# Patient Record
Sex: FEMALE | Race: WHITE | URBAN - METROPOLITAN AREA
[De-identification: names, ages, dates, MRNs, and addresses within clinical notes are randomized per-mention and may not be internally consistent; named-entity substitution may affect disease eponyms.]

---

## 2017-09-08 ENCOUNTER — IMPORTED ENCOUNTER (OUTPATIENT)
Dept: URBAN - METROPOLITAN AREA CLINIC 50 | Facility: CLINIC | Age: 67
End: 2017-09-08

## 2017-09-20 ENCOUNTER — IMPORTED ENCOUNTER (OUTPATIENT)
Dept: URBAN - METROPOLITAN AREA CLINIC 50 | Facility: CLINIC | Age: 67
End: 2017-09-20

## 2017-09-21 ENCOUNTER — IMPORTED ENCOUNTER (OUTPATIENT)
Dept: URBAN - METROPOLITAN AREA CLINIC 50 | Facility: CLINIC | Age: 67
End: 2017-09-21

## 2018-01-02 ENCOUNTER — IMPORTED ENCOUNTER (OUTPATIENT)
Dept: URBAN - METROPOLITAN AREA CLINIC 50 | Facility: CLINIC | Age: 68
End: 2018-01-02

## 2018-01-23 ENCOUNTER — IMPORTED ENCOUNTER (OUTPATIENT)
Dept: URBAN - METROPOLITAN AREA CLINIC 50 | Facility: CLINIC | Age: 68
End: 2018-01-23

## 2018-02-06 ENCOUNTER — IMPORTED ENCOUNTER (OUTPATIENT)
Dept: URBAN - METROPOLITAN AREA CLINIC 50 | Facility: CLINIC | Age: 68
End: 2018-02-06

## 2018-02-20 ENCOUNTER — IMPORTED ENCOUNTER (OUTPATIENT)
Dept: URBAN - METROPOLITAN AREA CLINIC 50 | Facility: CLINIC | Age: 68
End: 2018-02-20

## 2018-03-08 ENCOUNTER — IMPORTED ENCOUNTER (OUTPATIENT)
Dept: URBAN - METROPOLITAN AREA CLINIC 50 | Facility: CLINIC | Age: 68
End: 2018-03-08

## 2018-03-13 ENCOUNTER — IMPORTED ENCOUNTER (OUTPATIENT)
Dept: URBAN - METROPOLITAN AREA CLINIC 50 | Facility: CLINIC | Age: 68
End: 2018-03-13

## 2018-03-23 ENCOUNTER — IMPORTED ENCOUNTER (OUTPATIENT)
Dept: URBAN - METROPOLITAN AREA CLINIC 50 | Facility: CLINIC | Age: 68
End: 2018-03-23

## 2018-03-28 ENCOUNTER — IMPORTED ENCOUNTER (OUTPATIENT)
Dept: URBAN - METROPOLITAN AREA CLINIC 50 | Facility: CLINIC | Age: 68
End: 2018-03-28

## 2018-04-23 ENCOUNTER — IMPORTED ENCOUNTER (OUTPATIENT)
Dept: URBAN - METROPOLITAN AREA CLINIC 50 | Facility: CLINIC | Age: 68
End: 2018-04-23

## 2018-09-13 ENCOUNTER — IMPORTED ENCOUNTER (OUTPATIENT)
Dept: URBAN - METROPOLITAN AREA CLINIC 50 | Facility: CLINIC | Age: 68
End: 2018-09-13

## 2018-09-17 ENCOUNTER — IMPORTED ENCOUNTER (OUTPATIENT)
Dept: URBAN - METROPOLITAN AREA CLINIC 50 | Facility: CLINIC | Age: 68
End: 2018-09-17

## 2018-10-02 ENCOUNTER — IMPORTED ENCOUNTER (OUTPATIENT)
Dept: URBAN - METROPOLITAN AREA CLINIC 50 | Facility: CLINIC | Age: 68
End: 2018-10-02

## 2018-10-02 NOTE — PATIENT DISCUSSION
"""Early. 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 MVI/AREDS/Amsler. Patient to call if vision changes or distortion increases. Good diet/do not smoke. """

## 2018-10-02 NOTE — PATIENT DISCUSSION
",""Informed patient that their cataract(s) are not visually significant or do not meet the criteria for ""

## 2019-04-09 ENCOUNTER — IMPORTED ENCOUNTER (OUTPATIENT)
Dept: URBAN - METROPOLITAN AREA CLINIC 50 | Facility: CLINIC | Age: 69
End: 2019-04-09

## 2019-04-09 NOTE — PATIENT DISCUSSION
"""IOP very stable on current therapy. Will monitor annually with testing."" ""Continue Shantell Ahumada left eye twice a day . """

## 2019-04-17 ENCOUNTER — IMPORTED ENCOUNTER (OUTPATIENT)
Dept: URBAN - METROPOLITAN AREA CLINIC 50 | Facility: CLINIC | Age: 69
End: 2019-04-17

## 2019-10-25 ENCOUNTER — IMPORTED ENCOUNTER (OUTPATIENT)
Dept: URBAN - METROPOLITAN AREA CLINIC 50 | Facility: CLINIC | Age: 69
End: 2019-10-25

## 2020-04-03 ENCOUNTER — IMPORTED ENCOUNTER (OUTPATIENT)
Dept: URBAN - METROPOLITAN AREA CLINIC 50 | Facility: CLINIC | Age: 70
End: 2020-04-03

## 2020-04-07 ENCOUNTER — IMPORTED ENCOUNTER (OUTPATIENT)
Dept: URBAN - METROPOLITAN AREA CLINIC 50 | Facility: CLINIC | Age: 70
End: 2020-04-07

## 2020-07-01 ENCOUNTER — IMPORTED ENCOUNTER (OUTPATIENT)
Dept: URBAN - METROPOLITAN AREA CLINIC 50 | Facility: CLINIC | Age: 70
End: 2020-07-01

## 2020-07-13 ENCOUNTER — IMPORTED ENCOUNTER (OUTPATIENT)
Dept: URBAN - METROPOLITAN AREA CLINIC 50 | Facility: CLINIC | Age: 70
End: 2020-07-13

## 2020-07-22 ENCOUNTER — IMPORTED ENCOUNTER (OUTPATIENT)
Dept: URBAN - METROPOLITAN AREA CLINIC 50 | Facility: CLINIC | Age: 70
End: 2020-07-22

## 2020-07-24 ENCOUNTER — IMPORTED ENCOUNTER (OUTPATIENT)
Dept: URBAN - METROPOLITAN AREA CLINIC 50 | Facility: CLINIC | Age: 70
End: 2020-07-24

## 2020-08-28 ENCOUNTER — IMPORTED ENCOUNTER (OUTPATIENT)
Dept: URBAN - METROPOLITAN AREA CLINIC 50 | Facility: CLINIC | Age: 70
End: 2020-08-28

## 2020-08-28 NOTE — PATIENT DISCUSSION
"""Patient given final contact lens prescription. 8/28/2020 "" ""Reviewed proper use and contact lens care. Discussed possible risks associated with contact lens wear.  Educated patient no sleeping

## 2020-10-07 ENCOUNTER — PREPPED CHART (OUTPATIENT)
Dept: URBAN - METROPOLITAN AREA CLINIC 53 | Facility: CLINIC | Age: 70
End: 2020-10-07

## 2020-10-07 ENCOUNTER — IMPORTED ENCOUNTER (OUTPATIENT)
Dept: URBAN - METROPOLITAN AREA CLINIC 50 | Facility: CLINIC | Age: 70
End: 2020-10-07

## 2020-10-07 NOTE — PATIENT DISCUSSION
"""Monitor ERM for changes. Informed patient of potential for worsening. Instructed patient to call with changes in vision. Recommend regular Amsler grid checks.  ""."

## 2020-10-07 NOTE — PATIENT DISCUSSION
"""Continue Artificial tears both eyes as needed . "" ""Continue HydroEye by mouth once a day . ""."

## 2021-04-16 ASSESSMENT — TONOMETRY
OD_IOP_MMHG: 15
OS_IOP_MMHG: 15
OD_IOP_MMHG: 16
OS_IOP_MMHG: 16

## 2021-04-16 ASSESSMENT — VISUAL ACUITY
OD_CC: 20/25
OS_CC: 20/25
OS_GLARE: 20/30
OD_GLARE: 20/30
OS_GLARE: 20/30
OD_GLARE: 20/30
OU_CC: J1+

## 2021-04-18 ASSESSMENT — VISUAL ACUITY
OD_BAT: 20/60
OD_CC: J1+
OD_CC: J1+(-2)@ 16 IN
OS_CC: J1+(-2)@ 16 IN
OD_PH: 20/25
OS_CC: 20/25
OS_OTHER: 20/30. 20/40.
OS_CC: 20/25
OD_CC: J1@ 14 IN
OS_CC: 20/25
OD_CC: J1@ 16 IN
OS_CC: J1@ 14 IN
OD_CC: 20/25-2
OD_CC: J1+@ 12 IN
OS_CC: 20/30
OS_BAT: 20/30
OS_CC: 20/50+2
OS_CC: J1+@ 12 IN
OD_CC: J1
OD_CC: 20/30
OD_OTHER: 20/30. 20/30.
OD_BAT: 20/60
OS_BAT: 20/60
OD_CC: 20/25
OD_CC: 20/50+2
OS_CC: 20/25
OS_CC: J1-
OS_CC: 20/25-
OD_BAT: 20/200
OS_PH: 20/30
OS_OTHER: 20/60. 20/70.
OS_BAT: 20/50
OS_BAT: 20/30
OS_OTHER: 20/100. >20/400.
OD_CC: J1+
OS_CC: J1+
OS_OTHER: 20/30. 20/30.
OD_OTHER: 20/60. 20/80.
OD_CC: 20/30
OD_OTHER: 20/25. 20/50.
OS_CC: J1+
OD_CC: 20/25-1
OS_CC: J1@ 16 IN
OD_CC: J1-
OD_BAT: 20/30
OS_CC: J1
OS_CC: 20/25-
OD_BAT: 20/25
OD_CC: 20/30
OD_CC: 20/30
OS_BAT: 20/100
OD_OTHER: 20/200. >20/400.
OD_CC: 20/30
OD_OTHER: 20/60. 20/80.
OD_CC: 20/40+-
OS_OTHER: 20/50. 20/60.
OS_CC: 20/20-1
OS_CC: 20/25-1
OS_CC: 20/30
OD_CC: 20/25-

## 2021-04-18 ASSESSMENT — PACHYMETRY
OD_CT_UM: 567
OD_CT_UM: 567
OS_CT_UM: 570
OS_CT_UM: 570
OD_CT_UM: 567
OD_CT_UM: 567
OS_CT_UM: 570
OS_CT_UM: 570
OD_CT_UM: 567
OS_CT_UM: 570
OD_CT_UM: 567
OD_CT_UM: 567
OS_CT_UM: 570
OS_CT_UM: 570
OD_CT_UM: 567
OD_CT_UM: 567
OS_CT_UM: 570
OD_CT_UM: 567
OD_CT_UM: 567
OS_CT_UM: 570
OD_CT_UM: 567
OS_CT_UM: 570
OD_CT_UM: 567
OS_CT_UM: 570
OS_CT_UM: 570
OD_CT_UM: 567
OS_CT_UM: 570
OD_CT_UM: 567
OD_CT_UM: 567
OS_CT_UM: 570
OD_CT_UM: 567
OS_CT_UM: 570
OD_CT_UM: 567
OS_CT_UM: 570

## 2021-04-18 ASSESSMENT — TONOMETRY
OD_IOP_MMHG: 17.5
OD_IOP_MMHG: 17
OS_IOP_MMHG: 15
OD_IOP_MMHG: 16
OD_IOP_MMHG: 16
OS_IOP_MMHG: 18
OS_IOP_MMHG: 16
OS_IOP_MMHG: 17
OD_IOP_MMHG: 18.5
OD_IOP_MMHG: 15
OS_IOP_MMHG: 18
OS_IOP_MMHG: 17
OD_IOP_MMHG: 17
OD_IOP_MMHG: 18
OD_IOP_MMHG: 17
OD_IOP_MMHG: 18
OS_IOP_MMHG: 17
OD_IOP_MMHG: 17
OS_IOP_MMHG: 19
OS_IOP_MMHG: 19
OD_IOP_MMHG: 18
OS_IOP_MMHG: 18

## 2021-04-21 ENCOUNTER — CATARACT EVALUATION (OUTPATIENT)
Dept: URBAN - METROPOLITAN AREA CLINIC 53 | Facility: CLINIC | Age: 71
End: 2021-04-21

## 2021-04-21 DIAGNOSIS — H25.813: ICD-10-CM

## 2021-04-21 DIAGNOSIS — H16.223: ICD-10-CM

## 2021-04-21 DIAGNOSIS — H43.813: ICD-10-CM

## 2021-04-21 DIAGNOSIS — H47.233: ICD-10-CM

## 2021-04-21 DIAGNOSIS — H35.373: ICD-10-CM

## 2021-04-21 DIAGNOSIS — H40.013: ICD-10-CM

## 2021-04-21 PROCEDURE — 92134 CPTRZ OPH DX IMG PST SGM RTA: CPT

## 2021-04-21 PROCEDURE — 92014 COMPRE OPH EXAM EST PT 1/>: CPT

## 2021-04-21 ASSESSMENT — VISUAL ACUITY
OS_CC: 20/25
OD_GLARE: 20/40
OD_GLARE: 20/50
OS_GLARE: 20/40
OD_CC: 20/30
OU_CC: J1
OS_GLARE: 20/30

## 2021-04-21 ASSESSMENT — TONOMETRY
OS_IOP_MMHG: 17
OS_IOP_MMHG: 16
OD_IOP_MMHG: 17
OD_IOP_MMHG: 16

## 2021-04-21 NOTE — PATIENT DISCUSSION
VISUALLY SIGNIFICANT: Informed patient that their cataract is visually significant and that surgery is recommended to improve patient's visual acuity and/or glare symptoms. RBAs of surgery discussed and questions answered. Patient to schedule biometry measurements and surgery.

## 2021-06-22 ENCOUNTER — BIOMETRY (OUTPATIENT)
Dept: URBAN - METROPOLITAN AREA CLINIC 53 | Facility: CLINIC | Age: 71
End: 2021-06-22

## 2021-06-22 DIAGNOSIS — H25.813: ICD-10-CM

## 2021-06-22 PROCEDURE — TOPOIOL CORNEAL TOPOGRAPHY-PREMIUM IOL

## 2021-06-22 PROCEDURE — 92136 OPHTHALMIC BIOMETRY: CPT

## 2021-06-22 ASSESSMENT — KERATOMETRY
OD_AXISANGLE_DEGREES: 095
OD_K2POWER_DIOPTERS: 43.50
OD_AXISANGLE2_DEGREES: 5
OS_K1POWER_DIOPTERS: 44.37
OS_AXISANGLE2_DEGREES: 175
OD_K1POWER_DIOPTERS: 43.87
OS_K2POWER_DIOPTERS: 43.12
OS_AXISANGLE_DEGREES: 085

## 2021-06-22 NOTE — PATIENT DISCUSSION
WILL DO IMMERSION WITH FRENCH PRIOR TO PRE OP DUE TO MACHINE AT [facility] GIVING INCONSISTENT MEASUREMENTS.

## 2021-06-25 ENCOUNTER — BIOMETRY (OUTPATIENT)
Dept: URBAN - METROPOLITAN AREA CLINIC 49 | Facility: CLINIC | Age: 71
End: 2021-06-25

## 2021-06-25 DIAGNOSIS — H25.813: ICD-10-CM

## 2021-06-25 PROCEDURE — 92136 OPHTHALMIC BIOMETRY: CPT

## 2021-06-25 ASSESSMENT — KERATOMETRY
OS_AXISANGLE_DEGREES: 085
OS_AXISANGLE2_DEGREES: 175
OD_K2POWER_DIOPTERS: 43.50
OD_K1POWER_DIOPTERS: 43.87
OS_K2POWER_DIOPTERS: 43.12
OD_AXISANGLE2_DEGREES: 5
OD_AXISANGLE_DEGREES: 095
OS_K1POWER_DIOPTERS: 44.37

## 2021-06-25 NOTE — PATIENT DISCUSSION
WILL DO IMMERSION WITH FRENCH PRIOR TO PRE OP DUE TO MACHINE AT [facility] GIVING INCONSISTENT MEASUREMENTS.

## 2021-06-30 ENCOUNTER — CATARACT PRE-OP (OUTPATIENT)
Dept: URBAN - METROPOLITAN AREA CLINIC 53 | Facility: CLINIC | Age: 71
End: 2021-06-30

## 2021-06-30 DIAGNOSIS — H35.373: ICD-10-CM

## 2021-06-30 DIAGNOSIS — H25.811: ICD-10-CM

## 2021-06-30 PROCEDURE — 92134 CPTRZ OPH DX IMG PST SGM RTA: CPT

## 2021-06-30 PROCEDURE — PREOP PRE OP VISIT

## 2021-06-30 ASSESSMENT — TONOMETRY
OD_IOP_MMHG: 18
OS_IOP_MMHG: 18

## 2021-06-30 ASSESSMENT — KERATOMETRY
OS_AXISANGLE_DEGREES: 085
OD_K2POWER_DIOPTERS: 43.50
OS_K1POWER_DIOPTERS: 44.37
OD_K1POWER_DIOPTERS: 43.87
OS_AXISANGLE2_DEGREES: 175
OD_AXISANGLE2_DEGREES: 5
OD_AXISANGLE_DEGREES: 095
OS_K2POWER_DIOPTERS: 43.12

## 2021-06-30 ASSESSMENT — VISUAL ACUITY
OD_CC: 20/40
OS_CC: 20/40

## 2021-06-30 NOTE — PATIENT DISCUSSION
Long discussion on how MF lens would not be a great fit. High probability of glare and halos with MF lens.

## 2021-06-30 NOTE — PATIENT DISCUSSION
Patient aware of the part time/full time use of glasses or CL after surgery secondary to high myopia.

## 2021-06-30 NOTE — PATIENT DISCUSSION
CATARACT SURGERY PLANNER - STANDARD IOL/+FEMTO: Phacoemulsification with IOL: Eye: OD|DOS: 7/15/21|Model: DIBOO|Power: 10. 5|Target: PLANO|Femto: YES|Arcs: 25 @ 95 ; 25 @ 275|Visc: DUET|Omidria: YES|10% Phenylephrine: YES|Epi-shugarcaine: YES|Phaco Setting: STD|BSS+: NO|Trypan Blue: NO|CTR: NO|Olive Tip: NO|Atropine: NO|Pupilloplasty: NO| +TM|Notes: PLAN: DIBOO @ PLANO OU; HX ERM OD>OS +MILD TRACTION OD, MICRODRUSEN OS, LR GLAUC SUSP OU, H/O RPG'S. DILATED PUPIL: UNKNOWN.

## 2021-07-15 ENCOUNTER — SURGERY/PROCEDURE (OUTPATIENT)
Dept: URBAN - METROPOLITAN AREA SURGERY 16 | Facility: SURGERY | Age: 71
End: 2021-07-15

## 2021-07-15 ENCOUNTER — SAME DAY PO (OUTPATIENT)
Dept: URBAN - METROPOLITAN AREA CLINIC 48 | Facility: CLINIC | Age: 71
End: 2021-07-15

## 2021-07-15 DIAGNOSIS — Z98.41: ICD-10-CM

## 2021-07-15 DIAGNOSIS — H25.811: ICD-10-CM

## 2021-07-15 PROCEDURE — 66984 XCAPSL CTRC RMVL W/O ECP: CPT

## 2021-07-15 PROCEDURE — DIB00FEMTO EYHANCE MONOFOCAL IOL WITH FEMTO

## 2021-07-15 PROCEDURE — 99024 POSTOP FOLLOW-UP VISIT: CPT

## 2021-07-15 ASSESSMENT — KERATOMETRY
OD_AXISANGLE2_DEGREES: 5
OS_K1POWER_DIOPTERS: 44.37
OD_K1POWER_DIOPTERS: 43.87
OD_AXISANGLE_DEGREES: 095
OD_K2POWER_DIOPTERS: 43.50
OD_K2POWER_DIOPTERS: 43.50
OD_K1POWER_DIOPTERS: 43.87
OS_AXISANGLE_DEGREES: 085
OS_K2POWER_DIOPTERS: 43.12
OS_AXISANGLE2_DEGREES: 175
OS_AXISANGLE2_DEGREES: 175
OD_AXISANGLE2_DEGREES: 5
OD_AXISANGLE_DEGREES: 095
OS_K1POWER_DIOPTERS: 44.37
OS_AXISANGLE_DEGREES: 085
OS_K2POWER_DIOPTERS: 43.12

## 2021-07-15 ASSESSMENT — TONOMETRY: OD_IOP_MMHG: 22

## 2021-07-21 ENCOUNTER — PRE OP - CE/IOL OS / 1 WEEK PO OD (OUTPATIENT)
Dept: URBAN - METROPOLITAN AREA CLINIC 53 | Facility: CLINIC | Age: 71
End: 2021-07-21

## 2021-07-21 DIAGNOSIS — Z96.1: ICD-10-CM

## 2021-07-21 DIAGNOSIS — H25.812: ICD-10-CM

## 2021-07-21 PROCEDURE — 92136 - 2N OPHTHALMIC BIOMETRY BY PARTIAL COHERENCE INTERFEROMETRY WITH INTRAOCULAR LENS POWER CALCULATION

## 2021-07-21 PROCEDURE — PREOP PRE OP VISIT

## 2021-07-21 ASSESSMENT — KERATOMETRY
OS_AXISANGLE2_DEGREES: 175
OS_AXISANGLE_DEGREES: 085
OS_K1POWER_DIOPTERS: 44.37
OS_K2POWER_DIOPTERS: 43.12
OD_AXISANGLE2_DEGREES: 5
OD_K2POWER_DIOPTERS: 43.50
OD_AXISANGLE_DEGREES: 095
OD_K1POWER_DIOPTERS: 43.87

## 2021-07-21 ASSESSMENT — VISUAL ACUITY
OD_PH: 20/25-2
OS_SC: CF 5FT
OD_SC: J3-
OD_SC: J3-
OD_SC: 20/40

## 2021-07-21 ASSESSMENT — TONOMETRY
OD_IOP_MMHG: 18
OS_IOP_MMHG: 18

## 2021-07-21 NOTE — PATIENT DISCUSSION
CATARACT SURGERY PLANNER - STANDARD IOL/+FEMTO/ORA: Phacoemulsification with IOL: Eye: OS|DOS: 7/22/21|Model: DIBOO|Power: 8.0 ORA|Target: PLANO|Femto: YES|Arcs: 50 @ 80 ; 50 @ 260|Visc: DUET|Omidria: YES|10% Phenylephrine: YES|Epi-shugarcaine: YES|Phaco Setting: STD|BSS+: NO|Trypan Blue: NO|CTR: NO|Olive Tip: NO|Atropine: NO|Pupilloplasty: NO|Notes: PLAN: DIBOO @ PLANO OU; HX ERM OD>OS +MILD TRX OD, MICRODRUSEN OD, LR GLAUC SUSP OU. DILATED PUPIL: UNKNOWN.

## 2021-07-22 ENCOUNTER — SURGERY/PROCEDURE (OUTPATIENT)
Dept: URBAN - METROPOLITAN AREA SURGERY 16 | Facility: SURGERY | Age: 71
End: 2021-07-22

## 2021-07-22 ENCOUNTER — SAME DAY PO (OUTPATIENT)
Dept: URBAN - METROPOLITAN AREA CLINIC 48 | Facility: CLINIC | Age: 71
End: 2021-07-22

## 2021-07-22 DIAGNOSIS — H25.812: ICD-10-CM

## 2021-07-22 DIAGNOSIS — Z98.42: ICD-10-CM

## 2021-07-22 DIAGNOSIS — Z96.1: ICD-10-CM

## 2021-07-22 PROCEDURE — 66984 XCAPSL CTRC RMVL W/O ECP: CPT

## 2021-07-22 PROCEDURE — 99024 POSTOP FOLLOW-UP VISIT: CPT

## 2021-07-22 PROCEDURE — DIB00FEMTO EYHANCE MONOFOCAL IOL WITH FEMTO

## 2021-07-22 ASSESSMENT — KERATOMETRY
OD_AXISANGLE2_DEGREES: 5
OD_AXISANGLE2_DEGREES: 5
OS_K2POWER_DIOPTERS: 43.12
OS_AXISANGLE2_DEGREES: 175
OS_K1POWER_DIOPTERS: 44.37
OS_AXISANGLE_DEGREES: 085
OS_AXISANGLE_DEGREES: 085
OD_AXISANGLE_DEGREES: 095
OS_K2POWER_DIOPTERS: 43.12
OD_K1POWER_DIOPTERS: 43.87
OS_AXISANGLE2_DEGREES: 175
OS_K1POWER_DIOPTERS: 44.37
OD_K1POWER_DIOPTERS: 43.87
OD_K2POWER_DIOPTERS: 43.50
OD_K2POWER_DIOPTERS: 43.50
OD_AXISANGLE_DEGREES: 095

## 2021-07-22 ASSESSMENT — VISUAL ACUITY: OS_SC: 20/60-2

## 2021-07-22 ASSESSMENT — TONOMETRY: OS_IOP_MMHG: 28

## 2021-07-28 ENCOUNTER — 1 WEEK POST-OP (OUTPATIENT)
Dept: URBAN - METROPOLITAN AREA CLINIC 53 | Facility: CLINIC | Age: 71
End: 2021-07-28

## 2021-07-28 DIAGNOSIS — Z98.42: ICD-10-CM

## 2021-07-28 DIAGNOSIS — Z96.1: ICD-10-CM

## 2021-07-28 DIAGNOSIS — H35.373: ICD-10-CM

## 2021-07-28 PROCEDURE — 92134 CPTRZ OPH DX IMG PST SGM RTA: CPT

## 2021-07-28 PROCEDURE — 99024 POSTOP FOLLOW-UP VISIT: CPT

## 2021-07-28 ASSESSMENT — KERATOMETRY
OD_K2POWER_DIOPTERS: 43.50
OD_AXISANGLE_DEGREES: 095
OD_K1POWER_DIOPTERS: 43.87
OS_AXISANGLE2_DEGREES: 175
OS_K2POWER_DIOPTERS: 43.12
OD_AXISANGLE2_DEGREES: 5
OS_K1POWER_DIOPTERS: 44.37
OS_AXISANGLE_DEGREES: 085

## 2021-07-28 ASSESSMENT — VISUAL ACUITY
OS_SC: 20/30
OD_SC: 20/40
OS_PH: 20/20
OD_PH: 20/30

## 2021-07-28 ASSESSMENT — TONOMETRY
OD_IOP_MMHG: 21
OS_IOP_MMHG: 21

## 2021-07-28 NOTE — PATIENT DISCUSSION
Patient doing well post-operatively. Advised patient to continue post-operative drops and instructions as directed.

## 2021-07-28 NOTE — PATIENT DISCUSSION
Discussed with patient that her distortion of vision OD>OS that she is experiencing is secondary to macular pathology and the only way to correct is consult with retinal surgeon for a possible Membrane Peel.

## 2021-08-16 ENCOUNTER — 4 WEEK PO - CE/IOL (OUTPATIENT)
Dept: URBAN - METROPOLITAN AREA CLINIC 53 | Facility: CLINIC | Age: 71
End: 2021-08-16

## 2021-08-16 DIAGNOSIS — Z96.1: ICD-10-CM

## 2021-08-16 PROCEDURE — 92015 DETERMINE REFRACTIVE STATE: CPT

## 2021-08-16 PROCEDURE — 99024 POSTOP FOLLOW-UP VISIT: CPT

## 2021-08-16 ASSESSMENT — VISUAL ACUITY
OD_SC: 20/60-
OS_SC: J3
OS_SC: 20/40-
OD_SC: J3-

## 2021-08-16 ASSESSMENT — TONOMETRY
OD_IOP_MMHG: 17
OS_IOP_MMHG: 18

## 2021-09-10 ENCOUNTER — CONTACT LENS FITTING NEW (OUTPATIENT)
Dept: URBAN - METROPOLITAN AREA CLINIC 53 | Facility: CLINIC | Age: 71
End: 2021-09-10

## 2021-09-10 DIAGNOSIS — Z96.1: ICD-10-CM

## 2021-09-10 DIAGNOSIS — H52.4: ICD-10-CM

## 2021-09-10 PROCEDURE — NCCON CL FIT/CHECK, NO CHARGE

## 2021-09-10 ASSESSMENT — VISUAL ACUITY
OD_SC: 20/60
OU_SC: 20/25-1
OS_SC: 20/25-1
OU_CC: J1

## 2021-09-10 NOTE — PATIENT DISCUSSION
KEYUR obtained today. Will order RGP MF lens to [facility] locations. Dispense at CL Check 2-3 weeks.

## 2021-10-01 ENCOUNTER — CONTACT LENS CHECK (OUTPATIENT)
Dept: URBAN - METROPOLITAN AREA CLINIC 53 | Facility: CLINIC | Age: 71
End: 2021-10-01

## 2021-10-01 DIAGNOSIS — Z96.1: ICD-10-CM

## 2021-10-01 DIAGNOSIS — H52.4: ICD-10-CM

## 2021-10-01 PROCEDURE — CLF EW RGP

## 2021-10-01 ASSESSMENT — VISUAL ACUITY
OS_SC: 20/30+2
OD_SC: 20/60-2
OU_SC: 20/30

## 2021-10-29 ENCOUNTER — CONTACT LENS CHECK (OUTPATIENT)
Dept: URBAN - METROPOLITAN AREA CLINIC 53 | Facility: CLINIC | Age: 71
End: 2021-10-29

## 2021-10-29 DIAGNOSIS — Z96.1: ICD-10-CM

## 2021-10-29 DIAGNOSIS — H52.4: ICD-10-CM

## 2021-10-29 PROCEDURE — NCCON CL FIT/CHECK, NO CHARGE

## 2021-11-10 ENCOUNTER — 4 MONTH FOLLOW-UP (OUTPATIENT)
Dept: URBAN - METROPOLITAN AREA CLINIC 48 | Facility: CLINIC | Age: 71
End: 2021-11-10

## 2021-11-10 DIAGNOSIS — Z96.1: ICD-10-CM

## 2021-11-10 DIAGNOSIS — H43.813: ICD-10-CM

## 2021-11-10 DIAGNOSIS — H26.493: ICD-10-CM

## 2021-11-10 DIAGNOSIS — H16.223: ICD-10-CM

## 2021-11-10 DIAGNOSIS — H35.373: ICD-10-CM

## 2021-11-10 DIAGNOSIS — H40.013: ICD-10-CM

## 2021-11-10 PROCEDURE — 92014 COMPRE OPH EXAM EST PT 1/>: CPT

## 2021-11-10 ASSESSMENT — VISUAL ACUITY
OD_GLARE: 20/50
OS_CC: 20/25
OS_GLARE: 20/60
OU_CC: 20/25
OD_CC: 20/25-2
OS_GLARE: 20/50
OU_CC: J1
OD_GLARE: 20/60

## 2021-11-10 ASSESSMENT — TONOMETRY
OS_IOP_MMHG: 20
OD_IOP_MMHG: 20

## 2021-12-01 ENCOUNTER — YAG CAPSULOTOMY OD (OUTPATIENT)
Dept: URBAN - METROPOLITAN AREA CLINIC 53 | Facility: CLINIC | Age: 71
End: 2021-12-01

## 2021-12-01 DIAGNOSIS — H26.491: ICD-10-CM

## 2021-12-01 PROCEDURE — 66821 AFTER CATARACT LASER SURGERY: CPT

## 2021-12-01 RX ORDER — PREDNISOLONE ACETATE 10 MG/ML
1 SUSPENSION/ DROPS OPHTHALMIC TWICE A DAY
Start: 2021-12-01

## 2021-12-01 ASSESSMENT — VISUAL ACUITY
OD_PH: 20/40+2
OD_SC: 20/60
OS_PH: 20/20
OS_SC: 20/30

## 2021-12-01 ASSESSMENT — TONOMETRY
OS_IOP_MMHG: 18
OD_IOP_MMHG: 17

## 2021-12-01 NOTE — PROCEDURE NOTE: CLINICAL
PROCEDURE NOTE: YAG Capsulotomy OD. Diagnosis: Posterior Capsular Opacification (PCO). Prior to treatment, the risks/benefits/alternatives were discussed. The patient wished to proceed with procedure. Consent was signed. Proparacaine and brominidine were placed into the operative eye after the eye was dilated. Power = 1.6mJ. Number of pulses = 22. Patient tolerated procedure well and there were no complications. Post Laser instructions given. Dianna Silver

## 2021-12-08 ENCOUNTER — CLINIC PROCEDURE ONLY (OUTPATIENT)
Dept: URBAN - METROPOLITAN AREA CLINIC 53 | Facility: CLINIC | Age: 71
End: 2021-12-08

## 2021-12-08 DIAGNOSIS — H26.492: ICD-10-CM

## 2021-12-08 PROCEDURE — 66821 AFTER CATARACT LASER SURGERY: CPT

## 2021-12-08 ASSESSMENT — VISUAL ACUITY
OS_SC: 20/50+2
OD_SC: 20/50-2

## 2021-12-08 ASSESSMENT — TONOMETRY
OD_IOP_MMHG: 15
OS_IOP_MMHG: 16

## 2021-12-08 NOTE — PROCEDURE NOTE: CLINICAL
PROCEDURE NOTE: YAG Capsulotomy OS. Diagnosis: Posterior Capsular Opacification (PCO). Prior to treatment, the risks/benefits/alternatives were discussed. The patient wished to proceed with procedure. Consent was signed. Proparacaine and brominidine were placed into the operative eye after the eye was dilated. Power = 1.7mJ. Number of pulses = 20. Patient tolerated procedure well and there were no complications. Post Laser instructions given. Virgilio Loomis

## 2021-12-08 NOTE — PATIENT DISCUSSION
Yag Cap performed today with patient's signed consent. Patient to start Prednisolone acetate BID x7 days, then decrease to QD x7 days, then stop.

## 2022-01-05 ENCOUNTER — POST-OP (OUTPATIENT)
Dept: URBAN - METROPOLITAN AREA CLINIC 48 | Facility: CLINIC | Age: 72
End: 2022-01-05

## 2022-01-05 DIAGNOSIS — Z98.890: ICD-10-CM

## 2022-01-05 PROCEDURE — 99024 POSTOP FOLLOW-UP VISIT: CPT

## 2022-01-05 ASSESSMENT — VISUAL ACUITY
OD_CC: 20/30+1
OD_SC: 20/70+1
OS_SC: 20/30+2
OU_CC: J1+
OD_CC: J1+
OS_CC: J1+
OU_CC: 20/25
OS_CC: 20/25-1

## 2022-01-05 ASSESSMENT — TONOMETRY
OS_IOP_MMHG: 16
OD_IOP_MMHG: 17

## 2022-02-03 ENCOUNTER — DIAGNOSTICS ONLY (OUTPATIENT)
Dept: URBAN - METROPOLITAN AREA CLINIC 48 | Facility: CLINIC | Age: 72
End: 2022-02-03

## 2022-02-03 DIAGNOSIS — H40.013: ICD-10-CM

## 2022-02-03 PROCEDURE — 92133 CPTRZD OPH DX IMG PST SGM ON: CPT

## 2022-02-03 PROCEDURE — 92083 EXTENDED VISUAL FIELD XM: CPT

## 2022-02-03 NOTE — PATIENT DISCUSSION
OCT RNFL (02/03/2022) wnl and stable OD/OS. HVF (02/03/2022) ess full OD and sup defect OS which does not correlate with RNFL. Continue Simbrinza bid OS.

## 2022-05-16 NOTE — PATIENT DISCUSSION
Patient doing well post-operatively. Advised patient to continue post-operative drops and instructions as directed. (0) age 40 years or less

## 2022-06-09 ENCOUNTER — COMPREHENSIVE EXAM (OUTPATIENT)
Dept: URBAN - METROPOLITAN AREA CLINIC 48 | Facility: CLINIC | Age: 72
End: 2022-06-09

## 2022-06-09 DIAGNOSIS — H16.223: ICD-10-CM

## 2022-06-09 DIAGNOSIS — H43.813: ICD-10-CM

## 2022-06-09 DIAGNOSIS — H35.373: ICD-10-CM

## 2022-06-09 DIAGNOSIS — H40.013: ICD-10-CM

## 2022-06-09 PROCEDURE — 92014 COMPRE OPH EXAM EST PT 1/>: CPT

## 2022-06-09 PROCEDURE — 92134 CPTRZ OPH DX IMG PST SGM RTA: CPT

## 2022-06-09 ASSESSMENT — VISUAL ACUITY
OS_CC: 20/20-1
OU_CC: J1+
OU_CC: 20/20
OD_CC: 20/25

## 2022-06-09 ASSESSMENT — TONOMETRY
OD_IOP_MMHG: 18
OS_IOP_MMHG: 16

## 2022-06-28 ENCOUNTER — DIAGNOSTICS ONLY (OUTPATIENT)
Dept: URBAN - METROPOLITAN AREA CLINIC 48 | Facility: CLINIC | Age: 72
End: 2022-06-28

## 2022-06-28 DIAGNOSIS — H40.013: ICD-10-CM

## 2022-06-28 PROCEDURE — 92083 EXTENDED VISUAL FIELD XM: CPT

## 2022-06-28 NOTE — PATIENT DISCUSSION
OCT RNFL (02/03/2022) wnl and stable OD/OS. HVF (02/03/2022) ess full OD and sup defect OS which does not correlate with RNFL. Repeat HVF OS (06/28/2022) wnl and improved.  Continue Simbrinza bid OS.

## 2022-08-22 NOTE — PATIENT DISCUSSION
Retinal tear and detachment warning symptoms reviewed and patient instructed to call immediately if increasing floaters, flashes, or decreasing peripheral vision. No

## 2023-01-26 ENCOUNTER — FOLLOW UP (OUTPATIENT)
Dept: URBAN - METROPOLITAN AREA CLINIC 48 | Facility: LOCATION | Age: 73
End: 2023-01-26

## 2023-01-26 DIAGNOSIS — H40.013: ICD-10-CM

## 2023-01-26 DIAGNOSIS — H35.3131: ICD-10-CM

## 2023-01-26 PROCEDURE — 92012 INTRM OPH EXAM EST PATIENT: CPT

## 2023-01-26 PROCEDURE — 92134 CPTRZ OPH DX IMG PST SGM RTA: CPT

## 2023-01-26 ASSESSMENT — VISUAL ACUITY
OD_CC: 20/25-2
OS_CC: 20/25

## 2023-01-26 ASSESSMENT — TONOMETRY
OS_IOP_MMHG: 16
OD_IOP_MMHG: 18

## 2023-01-26 NOTE — PATIENT DISCUSSION
OCT RNFL (02/03/2022) wnl and stable OD/OS. HVF (02/03/2022) ess full OD and sup defect OS which does not correlate with RNFL. Repeat HVF OS (06/28/2022) wnl and improved.  Continue Simbrinza bid OS at this time. Will do a 2 week trial off gtt therapy.  Advised patient to stop Simbrinza 2 weeks prior to 6 month Comp Exam.

## 2023-07-27 ENCOUNTER — COMPREHENSIVE EXAM (OUTPATIENT)
Dept: URBAN - METROPOLITAN AREA CLINIC 48 | Facility: LOCATION | Age: 73
End: 2023-07-27

## 2023-07-27 DIAGNOSIS — H35.3131: ICD-10-CM

## 2023-07-27 DIAGNOSIS — H43.813: ICD-10-CM

## 2023-07-27 DIAGNOSIS — H52.4: ICD-10-CM

## 2023-07-27 DIAGNOSIS — H35.373: ICD-10-CM

## 2023-07-27 DIAGNOSIS — H40.053: ICD-10-CM

## 2023-07-27 PROCEDURE — 92083 EXTENDED VISUAL FIELD XM: CPT

## 2023-07-27 PROCEDURE — 92015 DETERMINE REFRACTIVE STATE: CPT

## 2023-07-27 PROCEDURE — 92133 CPTRZD OPH DX IMG PST SGM ON: CPT

## 2023-07-27 PROCEDURE — 92014 COMPRE OPH EXAM EST PT 1/>: CPT

## 2023-07-27 RX ORDER — BRINZOLAMIDE/BRIMONIDINE TARTRATE 10; 2 MG/ML; MG/ML: 1 SUSPENSION/ DROPS OPHTHALMIC TWICE A DAY

## 2023-07-27 ASSESSMENT — KERATOMETRY
OD_AXISANGLE_DEGREES: 180
OS_K2POWER_DIOPTERS: 44.00
OS_AXISANGLE2_DEGREES: 75
OD_AXISANGLE2_DEGREES: 90
OS_K1POWER_DIOPTERS: 43.50
OS_AXISANGLE_DEGREES: 165
OD_K2POWER_DIOPTERS: 43.75
OD_K1POWER_DIOPTERS: 43.75

## 2023-07-27 ASSESSMENT — VISUAL ACUITY
OD_CC: 20/25-2
OU_CC: J1
OS_CC: 20/20-2

## 2023-07-27 ASSESSMENT — TONOMETRY
OS_IOP_MMHG: 21
OD_IOP_MMHG: 21
OS_IOP_MMHG: 20
OD_IOP_MMHG: 22

## 2024-01-31 ENCOUNTER — FOLLOW UP (OUTPATIENT)
Dept: URBAN - METROPOLITAN AREA CLINIC 48 | Facility: LOCATION | Age: 74
End: 2024-01-31

## 2024-01-31 DIAGNOSIS — H35.3131: ICD-10-CM

## 2024-01-31 DIAGNOSIS — H40.053: ICD-10-CM

## 2024-01-31 PROCEDURE — 99214 OFFICE O/P EST MOD 30 MIN: CPT

## 2024-01-31 PROCEDURE — 92134 CPTRZ OPH DX IMG PST SGM RTA: CPT

## 2024-01-31 ASSESSMENT — KERATOMETRY
OD_AXISANGLE2_DEGREES: 90
OS_AXISANGLE2_DEGREES: 75
OS_K2POWER_DIOPTERS: 44.00
OD_AXISANGLE_DEGREES: 180
OS_AXISANGLE_DEGREES: 165
OD_K2POWER_DIOPTERS: 43.75
OS_K1POWER_DIOPTERS: 43.50
OD_K1POWER_DIOPTERS: 43.75

## 2024-01-31 ASSESSMENT — TONOMETRY
OS_IOP_MMHG: 19
OD_IOP_MMHG: 20
OD_IOP_MMHG: 20
OS_IOP_MMHG: 19

## 2024-01-31 ASSESSMENT — VISUAL ACUITY
OD_CC: 20/25-2
OS_CC: 20/20-1

## 2024-04-30 NOTE — PATIENT DISCUSSION
"""IOP stable both eyes. Reviewed ERG with Patient. Test was unreliable.  Patient to repeat "" Pt stated she's not sure if she takes the medication, rx sent 4/16/24 but also states not taking, will have her caregiver call with information - was it p/u or not

## 2024-07-30 ENCOUNTER — COMPREHENSIVE EXAM (OUTPATIENT)
Dept: URBAN - METROPOLITAN AREA CLINIC 53 | Facility: CLINIC | Age: 74
End: 2024-07-30

## 2024-07-30 DIAGNOSIS — H40.053: ICD-10-CM

## 2024-07-30 DIAGNOSIS — H35.373: ICD-10-CM

## 2024-07-30 DIAGNOSIS — H35.3131: ICD-10-CM

## 2024-07-30 DIAGNOSIS — H43.813: ICD-10-CM

## 2024-07-30 PROCEDURE — 92133 CPTRZD OPH DX IMG PST SGM ON: CPT

## 2024-07-30 PROCEDURE — 92083 EXTENDED VISUAL FIELD XM: CPT

## 2024-07-30 PROCEDURE — 99214 OFFICE O/P EST MOD 30 MIN: CPT

## 2024-07-30 ASSESSMENT — VISUAL ACUITY
OD_CC: 20/30
OU_CC: J1
OU_CC: 20/20-1
OS_CC: 20/25

## 2024-07-30 ASSESSMENT — TONOMETRY
OD_IOP_MMHG: 20
OS_IOP_MMHG: 19
OS_IOP_MMHG: 19
OD_IOP_MMHG: 20

## 2025-02-18 ENCOUNTER — FOLLOW UP (OUTPATIENT)
Age: 75
End: 2025-02-18

## 2025-02-18 DIAGNOSIS — H40.053: ICD-10-CM

## 2025-02-18 DIAGNOSIS — H35.3131: ICD-10-CM

## 2025-02-18 PROCEDURE — 99213 OFFICE O/P EST LOW 20 MIN: CPT

## 2025-02-18 PROCEDURE — 92134 CPTRZ OPH DX IMG PST SGM RTA: CPT
